# Patient Record
Sex: FEMALE | Race: WHITE | NOT HISPANIC OR LATINO | URBAN - METROPOLITAN AREA
[De-identification: names, ages, dates, MRNs, and addresses within clinical notes are randomized per-mention and may not be internally consistent; named-entity substitution may affect disease eponyms.]

---

## 2017-01-12 ENCOUNTER — GENERIC CONVERSION - ENCOUNTER (OUTPATIENT)
Dept: OTHER | Facility: OTHER | Age: 22
End: 2017-01-12

## 2017-01-16 ENCOUNTER — GENERIC CONVERSION - ENCOUNTER (OUTPATIENT)
Dept: OTHER | Facility: OTHER | Age: 22
End: 2017-01-16

## 2017-08-21 ENCOUNTER — ALLSCRIPTS OFFICE VISIT (OUTPATIENT)
Dept: OTHER | Facility: OTHER | Age: 22
End: 2017-08-21

## 2017-08-22 ENCOUNTER — ALLSCRIPTS OFFICE VISIT (OUTPATIENT)
Dept: OTHER | Facility: OTHER | Age: 22
End: 2017-08-22

## 2018-01-05 ENCOUNTER — GENERIC CONVERSION - ENCOUNTER (OUTPATIENT)
Dept: OTHER | Facility: OTHER | Age: 23
End: 2018-01-05

## 2018-01-10 NOTE — MISCELLANEOUS
Message   Recorded as Task   Date: 01/12/2017 12:36 PM, Created By: Melva Villavicencio   Task Name: Medical Complaint Callback   Assigned To: [place] Fee   Regarding Patient: Ema Jacobsen, Status: In Progress   Comment:    Genny Francisco - 12 Jan 2017 12:36 PM     TASK CREATED  Caller: Belen Izaguirre, Mother; Medical Complaint  Mom - Carliss Mon calling for daughter  Complaint of a yeast inf  Wanted Rx if possible  Daughter goes back to college this weekend  Genny Francisco - 12 Jan 2017 12:37 PM     TASK EDITED  mom is @ [phone]   Marylene Noon - 12 Jan 2017 1:01 PM     TASK IN PROGRESS   Maryphilomena Noon - 12 Jan 2017 1:03 PM     TASK EDITED  Pts Mom said pt has white dsch and itching  No odor  Wants the pill - is leaving for school Sat  Rx diflucan, 150 to ehr        Active Problems    1  Counseling for HPV (human papillomavirus) vaccination (V65 49) (Z71 89)   2  Encounter for annual routine gynecological examination (V72 31) (Z01 419)   3  Exercise-induced asthma (493 81) (J45 990)   4  Need for vaccination to prevent tuberculosis (V03 2) (Z23)   5  Surveillance for birth control, oral contraceptives (V25 41) (Z30 41)    Current Meds   1  TriNessa (28) 0 18/0 215/0 25 MG-35 MCG Oral Tablet; Take 1 tablet daily; Therapy: 70GEX9666 to (Evaluate:86Qsb9463); Last Rx:23Iyz1260 Ordered   2  Ventolin  (90 Base) MCG/ACT Inhalation Aerosol Solution; 1 puff prior to   exercise; Therapy: 91ESJ5059 to (Vikki Tian)  Requested for: 62NZG6830; Last   Rx:01Mjq0795 Ordered    Allergies    1  No Known Drug Allergies    Signatures   Electronically signed by :  Angela Gallo, ; Jan 12 2017  1:03PM EST                       (Author)

## 2018-01-13 VITALS
HEART RATE: 80 BPM | RESPIRATION RATE: 18 BRPM | SYSTOLIC BLOOD PRESSURE: 110 MMHG | HEIGHT: 64 IN | WEIGHT: 135.5 LBS | BODY MASS INDEX: 23.13 KG/M2 | DIASTOLIC BLOOD PRESSURE: 72 MMHG | TEMPERATURE: 98.9 F

## 2018-01-13 NOTE — PROGRESS NOTES
Assessment    1  Nexplanon insertion (V25 5) (Z30 017)    Discussion/Summary  Discussion Summary:   Nexplanon inserted without difficulty  Pt tolerated well  Reviewed reasons to call incl insertion site complications  Advised stric condom use x2 weeks after insertion then continued condoms for STI prevention  F/u PRN or at next routine annual gyn exam    Goals and Barriers: The patient has the current Goals: Prevent preg  The patent has the current Barriers: None  Patient's Capacity to Self-Care: Patient is able to Self-Care  Medication SE Review and Pt Understands Tx: Possible side effects of new medications were reviewed with the patient/guardian today  The treatment plan was reviewed with the patient/guardian  The patient/guardian understands and agrees with the treatment plan   Self Referrals:   Self Referrals: No      Chief Complaint  Chief Complaint Free Text Note Form: Patient is here today to get the Nexplanon insertion LMP was 08/11/2017   A urine pregnancy test was done in the office today and the results were negative   Nexplanon information Andrea Lazo [id number] Lot # [id number] expires on 08/31/2019   History of Present Illness  HPI: THis patient presents for insertion of Nexplanon  Currently using condoms  Not sexually active for >3 weeks and LMP was 2 weeks ago  Returning to college tomorrow and requests contraception prior to start of school, as she lives a distance away  She denies acute gyn complaints  Review of Systems   Female ROS: no pelvic pain, no nonmenstrual bleeding and no dysuria  Focused-Female:   Constitutional: No fever, no chills, feels well, no tiredness, no recent weight gain or loss  ENT: no ear ache, no loss of hearing, no nosebleeds or nasal discharge, no sore throat or hoarseness  Cardiovascular: no complaints of slow or fast heart rate, no chest pain, no palpitations, no leg claudication or lower extremity edema     Respiratory: no complaints of shortness of breath, no wheezing, no dyspnea on exertion, no orthopnea or PND  Breasts: no complaints of breast pain, breast lump or nipple discharge  Gastrointestinal: no complaints of abdominal pain, no constipation, no nausea or diarrhea, no vomiting, no bloody stools  Genitourinary: no complaints of dysuria, no incontinence, no pelvic pain, no dysmenorrhea, no vaginal discharge or abnormal vaginal bleeding  Musculoskeletal: no complaints of arthralgia, no myalgia, no joint swelling or stiffness, no limb pain or swelling  Integumentary: no complaints of skin rash or lesion, no itching or dry skin, no skin wounds  Neurological: no complaints of headache, no confusion, no numbness or tingling, no dizziness or fainting  ROS Reviewed:   ROS reviewed  Active Problems     1  Counseling for HPV (human papillomavirus) vaccination (V65 49) (Z71 89)   2  Encounter for annual routine gynecological examination (V72 31) (Z01 419)   3  Encounter for general counseling and advice on contraceptive management (V25 09)   (Z30 09)    Exercise-induced asthma (493 81) (J45 990)          Past Medical History    1  History of Dislocation Of The Patella (836 3)   2  History of  0 (V49 89)   3  History of acne (V13 3) (Z87 2)  Active Problems And Past Medical History Reviewed: The active problems and past medical history were reviewed and updated today  Surgical History    1  History of Appendectomy   2  History of Knee Surgery   3  History of Oral Surgery Tooth Extraction  Surgical History Reviewed: The surgical history was reviewed and updated today  Family History  Mother    1  Family history of osteoporosis (V17 81) (Z82 62)  Father    2  No pertinent family history  Maternal Grandfather    3  Family history of colon cancer (V16 0) (Z80 0)  Maternal Uncle    4  Family history of malignant neoplasm of bone (V16 8) (Z80 8)  Multiple Family Members    5   Family history of malignant neoplasm of breast (V16 3) (Z80 3)  Maternal Relatives    6  Family history of malignant neoplasm of breast (V16 3) (Z80 3)  Family History Reviewed: The family history was reviewed and updated today  Social History    · Activities: Bicycling   · Caffeine use (V49 89) (F15 90)   · Denied: History of Drug use   · Denied: History of    · Never A Smoker   · Never Drank Alcohol   · Sexually active   · Single   · Uses condoms (V15 89) (Z78 9)  Social History Reviewed: The social history was reviewed and updated today  Current Meds   1  Ventolin  (90 Base) MCG/ACT Inhalation Aerosol Solution; 1 puff prior to exercise; Therapy: 03MZP6065 to (Laneta Smoker)  Requested for: 30LPS2760; Last   Rx:2014 Ordered    Allergies    1  No Known Drug Allergies    Vitals  Vital Signs    Recorded:  75:43PX   Systolic 415, LUE, Sitting   Diastolic 70, LUE, Sitting   Height 5 ft 4 in   Weight 136 lb    BMI Calculated 23 34   BSA Calculated 1 66   LMP 64Gpz9603     Physical Exam    Constitutional   General appearance: No acute distress, well appearing and well nourished  Pulmonary   Respiratory effort: No increased work of breathing or signs of respiratory distress  Skin   Skin and subcutaneous tissue: Normal skin turgor and no rashes  Psychiatric   Orientation to person, place, and time: Normal     Mood and affect: Normal        Procedure  The risks/benefits were reviewed with the patient and written consent was obtained  The patient was positioned appropriately and comfortably  The insertion site was measured and marked per  recommendations and the site was cleaned with alcohol  1% lido without epi was injected in several sites along the insertion path  When her comfort was confirmed the site was cleansed with betadine x3  The device was inserted according to  instructions and pressure was applied at the insertion site   Hemostasis was noted and steristrips were applied in a star fashion over the incision site  A pressure dressing was then applied and skin care recommendations were reviewed  The patient tolerated the procedure well  She was advised to f/u as needed  Attending Note  Collaborating Physician Note: Collaborating Physician: I agree with the Advanced Practitioner note  Future Appointments    Date/Time Provider Specialty Site   11/22/2017 01:00 PM NGOZI Delarosa  [address] [place] [address]   08/24/2018 08:00 AM Britt Lopez OB     Signatures   Electronically signed by : KENIA Helton;  Aug 22 2017  2:35PM EST                       (Author)    Electronically signed by : Hoang Boykin DO; Aug 23 2017  9:18AM EST                       (Author)

## 2018-01-14 VITALS
WEIGHT: 134 LBS | HEIGHT: 64 IN | SYSTOLIC BLOOD PRESSURE: 120 MMHG | DIASTOLIC BLOOD PRESSURE: 70 MMHG | BODY MASS INDEX: 22.88 KG/M2

## 2018-01-17 NOTE — MISCELLANEOUS
Message   Recorded as Task   Date: 01/16/2017 10:03 AM, Created By: Radha Gonzales   Task Name: Call Back   Assigned To: Keisha Payne   Regarding Patient: Keya Carter, Status: In Progress   Comment:    ChuckieMi - 16 Jan 2017 10:03 AM     TASK CREATED  PT LM WITH THE SERVICE ON 1/15/17 AT 11:26AM THAT SHE TOOK 1 DOSE OF HER MEDS AND LEFT FOR [place] WITHOUT THE MEDICINE  PLEASE CALL PTS MOM KAREY [phone]   Lakshmi Titus - 64 Jan 2017 10:19 AM     TASK IN PROGRESS   Lakshmi Alllaura - 16 Jan 2017 10:27 AM     TASK EDITED  [phone]    150 mg, 1 po stat        Active Problems    1  Counseling for HPV (human papillomavirus) vaccination (V65 49) (Z71 89)   2  Encounter for annual routine gynecological examination (V72 31) (Z01 419)   3  Exercise-induced asthma (493 81) (J45 990)   4  Monilia infection (112 9) (B37 9)   5  Need for vaccination to prevent tuberculosis (V03 2) (Z23)   6  Surveillance for birth control, oral contraceptives (V25 41) (Z30 41)    Current Meds   1  Fluconazole 150 MG Oral Tablet (Diflucan); take one tablet today and one in 3 days; Therapy: 11HFF7691 to (Evaluate:15Jan2017)  Requested for: 45LFC5505; Last   Rx:12Jan2017 Ordered   2  TriNessa (28) 0 18/0 215/0 25 MG-35 MCG Oral Tablet; Take 1 tablet daily; Therapy: 51ATD5654 to (Evaluate:25Rqc7114); Last Rx:13Bts1830 Ordered   3  Ventolin  (90 Base) MCG/ACT Inhalation Aerosol Solution; 1 puff prior to   exercise; Therapy: 90LSM0544 to (Priscille Grade)  Requested for: 51NZE4401; Last   Rx:40Qae1830 Ordered    Allergies    1  No Known Drug Allergies    Signatures   Electronically signed by :  Sushila Gallo, ; Jan 16 2017 10:27AM EST                       (Author)

## 2018-01-22 VITALS
HEIGHT: 64 IN | DIASTOLIC BLOOD PRESSURE: 70 MMHG | SYSTOLIC BLOOD PRESSURE: 110 MMHG | WEIGHT: 136 LBS | BODY MASS INDEX: 23.22 KG/M2

## 2018-01-24 VITALS
HEIGHT: 64 IN | SYSTOLIC BLOOD PRESSURE: 102 MMHG | DIASTOLIC BLOOD PRESSURE: 68 MMHG | BODY MASS INDEX: 23.11 KG/M2 | WEIGHT: 135.38 LBS

## 2018-03-02 ENCOUNTER — TELEPHONE (OUTPATIENT)
Dept: OBGYN CLINIC | Facility: CLINIC | Age: 23
End: 2018-03-02

## 2018-03-02 NOTE — TELEPHONE ENCOUNTER
Pt needs refill called in to pharmacy in [address] [place] [address] called in [phone] number [phone] the name of drug store is [facility] services has a yeast infection had it in the past, pt's number [phone]

## 2018-03-05 NOTE — TELEPHONE ENCOUNTER
Spoke with pt - symptoms started last Wednesday  She has itching and burning when urine touches skin  Feels inflamed  She is on her period - when she inserts the tampons, feels the irritation  No OTC product used  Wants an Rx  Please advise  Thanks!

## 2018-03-06 NOTE — TELEPHONE ENCOUNTER
Diflucan 150mg po x one dose, repeat in 3 days  Also nystatin cream BID x 14 days   If not all better in 2 weeks, needs appt

## 2018-03-06 NOTE — TELEPHONE ENCOUNTER
Called patient and left message that meds were called into pharmacy  To call back with any questions  If no improvement in 2 weeks please call for appt

## 2018-03-08 DIAGNOSIS — N89.8 VAGINAL IRRITATION: Primary | ICD-10-CM

## 2018-03-08 RX ORDER — FLUCONAZOLE 150 MG/1
TABLET ORAL
Qty: 2 TABLET | Refills: 0 | Status: SHIPPED | OUTPATIENT
Start: 2018-03-08 | End: 2018-03-11

## 2018-03-08 RX ORDER — NYSTATIN 100000 U/G
CREAM TOPICAL
Qty: 30 G | Refills: 0 | Status: SHIPPED | OUTPATIENT
Start: 2018-03-08

## 2018-03-08 NOTE — TELEPHONE ENCOUNTER
Pt called in regards to medication that was suppose to be sent to pharmacy for yeast infection   Pt went to pharmacy on Tuesday and it was not there please advise rx [facility] services

## 2020-07-27 ENCOUNTER — PROCEDURE VISIT (OUTPATIENT)
Dept: OBGYN CLINIC | Facility: CLINIC | Age: 25
End: 2020-07-27
Payer: COMMERCIAL

## 2020-07-27 VITALS
DIASTOLIC BLOOD PRESSURE: 72 MMHG | SYSTOLIC BLOOD PRESSURE: 110 MMHG | BODY MASS INDEX: 26.09 KG/M2 | WEIGHT: 152 LBS | TEMPERATURE: 99.3 F

## 2020-07-27 DIAGNOSIS — Z30.46 ENCOUNTER FOR REMOVAL OF SUBDERMAL CONTRACEPTIVE IMPLANT: ICD-10-CM

## 2020-07-27 DIAGNOSIS — Z30.017 ENCOUNTER FOR INSERTION SUBDERMAL CONTRACEPTIVE: Primary | ICD-10-CM

## 2020-07-27 PROCEDURE — 11983 REMOVE/INSERT DRUG IMPLANT: CPT | Performed by: NURSE PRACTITIONER

## 2020-07-27 NOTE — PROGRESS NOTES
Remove and insert drug implant  Date/Time: 7/27/2020 1:15 PM  Performed by: KENIA Aguilar  Authorized by: KENIA Aguilar     Consent:     Consent obtained:  Verbal and written    Consent given by:  Patient    Procedural risks discussed:  Bleeding, damage to other organs, failure rate, infection, possible continued pain, possible conversion to open surgery, possible loss of function and repeat procedure    Patient questions answered: yes      Patient agrees, verbalizes understanding, and wants to proceed: yes      Instructions and paperwork completed: yes    Indication:     Indication comment:  Removal of device that expires 8/2020 and reinsertion of new device   Pre-procedure:     Pre-procedure timeout performed: yes      Prepped with: povidone-iodine      Local anesthetic:  Lidocaine without epinephrine    The site was cleaned and prepped in a sterile fashion: yes    Procedure:     Procedure:  Removal with reinsertion    Small stab incision was made in arm: yes (stab incision was made with small T-shaped extension at the medial end )      Left/right:  Left    Preloaded contraceptive capsule trocar was placed subdermally: yes      Visualization of implant was obtained: yes      Contraceptive capsule was inserted and trocar removed: yes      Visualization of notch in stylet and palpation of device: yes      Palpation confirms placement by provider and patient: yes      Site was closed with steri-strips and pressure bandage applied: yes    Comments: The procedure was reviewed and consent was obtained  Time out performed and allergies confirmed  The patient was positioned appropriately and the device was palpated  The skin was prepped and lido was injected in several sites along the insert tract  Small incision was made with #11 blade  The device was encapsulated notably and this was reduced  A T-shaped extension was made at the medial end of the stab incision   The device was then visualized and was grasped with sterile hemostats and removed  The device was noted to be intact and this was discarded  Pressure was applied until hemostasis was noted  The new device was inserted in the usual fashion and was palpated  Steristrips were applied and a pressure dressing was applied  The patient tolerated well  Reviewed post-removal reasons to call incl pain, bleeding or sx of infection

## 2020-07-27 NOTE — ASSESSMENT & PLAN NOTE
Nexplanon removed and reinserted without difficulty  T-shape extension of the incision was made and a notable degree of encapsulation was observed however the patient tolerated this well and sutures were not required  Reviewed sx to report and reasons to call, incl significant pain, bleeding or sx of infection  She is aware condoms are recommended for STI prevention as well as back up   F/u PRN

## 2020-07-27 NOTE — PATIENT INSTRUCTIONS
Etonogestrel (Implant)   Etonogestrel (e-toe-nereida-ANTON-trel)  Prevents pregnancy  Brand Name(s): Nexplanon   There may be other brand names for this medicine  When This Medicine Should Not Be Used: This medicine is not right for everyone  You should not receive it if you had an allergic reaction to etonogestrel, or if you are pregnant  Do not use it if you have breast cancer, heart disease, liver disease, or a history of blood clots (such as deep vein thrombosis, pulmonary embolism, or stroke)  How to Use This Medicine:   Implant  · A nurse or other trained health professional will give you this medicine  · This medicine is an implant  It will be surgically placed under the skin of your upper, inner arm  · Gently press your fingertips over the skin where this medicine was inserted  You should be able to feel the implant  · You might have to use another form of birth control for 7 days after the implant is inserted  Your doctor will tell you if this is needed  · Your doctor can remove the implant at any time if you want to stop using this medicine  The implant must be removed after 3 years, but you may have a new one inserted if you still want to use this form of birth control  · Read and follow the patient instructions that come with this medicine  Talk to your doctor or pharmacist if you have any questions  Drugs and Foods to Avoid:   Ask your doctor or pharmacist before using any other medicine, including over-the-counter medicines, vitamins, and herbal products  · Some foods and medicines can affect how etonogestrel works  Tell your doctor if you are using any of the following:  ¨ Bosentan, carbamazepine, cyclosporine, felbamate, griseofulvin, itraconazole, ketoconazole, lamotrigine, oxcarbazepine, phenobarbital, phenytoin, rifampin, Farrah wort, topiramate  ¨ Medicine for HIV/AIDS  Warnings While Using This Medicine:   · Tell your doctor right away if you think you become pregnant   The implant will need to be removed  · Tell your doctor if you have cancer, blood circulation problems, high blood pressure, or kidney disease, or if you smoke  Tell your doctor if you are breastfeeding, or if you have recently given birth  Also tell your doctor if you have diabetes or prediabetes, high cholesterol, or a history of depression  · This medicine may cause the following problems:  ¨ Ectopic (tubal) pregnancy  ¨ Cysts in the ovaries  ¨ Possible risk of breast cancer  ¨ Higher risk of heart attack, stroke, or blood clots  ¨ Liver cancers or tumors  ¨ High blood pressure  · This medicine may change your usual menstrual cycle  You might have irregular bleeding, or your periods may be lighter, shorter, heavier, or longer  You might not have a period in some cycles  However, call your doctor if you think you are pregnant or if you have severe pain or changes that worry you  · This medicine will not protect you from HIV/AIDS or other sexually transmitted diseases  · You might need to have the implant removed if you will be inactive for a period of time, such as after major surgery, because of the risk of blood clots  · Tell any doctor or dentist who treats you that you are using this medicine  This medicine may affect certain medical test results  · Your doctor will check your progress and the effects of this medicine at regular visits  Keep all appointments  · Keep all medicine out of the reach of children  Never share your medicine with anyone    Possible Side Effects While Using This Medicine:   Call your doctor right away if you notice any of these side effects:  · Allergic reaction: Itching or hives, swelling in your face or hands, swelling or tingling in your mouth or throat, chest tightness, trouble breathing  · Chest pain, trouble breathing, or coughing up blood  · Dark urine or pale stools, nausea, vomiting, loss of appetite, stomach pain, yellow skin or eyes  · Double vision or other trouble seeing  · Numbness or weakness on one side of your body, sudden or severe headache, problems with vision, speech, or walking  · Pain in your lower leg (calf)  · Severe or ongoing pain, tingling, bleeding, bruising, redness, itching, or swelling where the implant is placed  · Unusual or severe pain in your abdomen  · Unusual or unexpected vaginal bleeding or heavy bleeding  If you notice these less serious side effects, talk with your doctor:   · Acne or pimples  · Mild headache  · Mild pain, tingling, bleeding, bruising, redness, itching, or swelling where the implant is placed  · Mood changes  · Weight gain  If you notice other side effects that you think are caused by this medicine, tell your doctor  Call your doctor for medical advice about side effects  You may report side effects to FDA at 1-313-FDA-8125  © 2017 [employer] Information is for End User's use only and may not be sold, redistributed or otherwise used for commercial purposes  The above information is an  only  It is not intended as medical advice for individual conditions or treatments  Talk to your doctor, nurse or pharmacist before following any medical regimen to see if it is safe and effective for you

## 2020-08-03 ENCOUNTER — TELEPHONE (OUTPATIENT)
Dept: OBGYN CLINIC | Facility: CLINIC | Age: 25
End: 2020-08-03

## 2020-08-03 ENCOUNTER — OFFICE VISIT (OUTPATIENT)
Dept: OBGYN CLINIC | Facility: CLINIC | Age: 25
End: 2020-08-03
Payer: COMMERCIAL

## 2020-08-03 VITALS
WEIGHT: 150 LBS | DIASTOLIC BLOOD PRESSURE: 70 MMHG | TEMPERATURE: 97.6 F | BODY MASS INDEX: 25.75 KG/M2 | SYSTOLIC BLOOD PRESSURE: 128 MMHG

## 2020-08-03 DIAGNOSIS — Z97.5 NEXPLANON IN PLACE: Primary | ICD-10-CM

## 2020-08-03 PROCEDURE — 99213 OFFICE O/P EST LOW 20 MIN: CPT | Performed by: NURSE PRACTITIONER

## 2020-08-03 NOTE — TELEPHONE ENCOUNTER
Pt had new nexplanon inserted and prior one removed 1 week ago  She said she has a "pinching type pain" down entire arm since then  She also has discomfort under armpit ( I tried to have her feel for lump - could not find any)  No signs of infection - no redness, fever , etc  She said it is slightly better today  Wait it out? Or what f/u?   Thank you

## 2020-08-03 NOTE — TELEPHONE ENCOUNTER
Is she able to come in for me to look at it today or tomorrow?    She can be double booked in my 3:20 spots if that works for her

## 2020-08-03 NOTE — TELEPHONE ENCOUNTER
Pt had a nexplanon inserted on 7/27 and is having pain under armpit and at the site of implant is this normal?

## 2020-08-04 PROBLEM — Z97.5 NEXPLANON IN PLACE: Status: ACTIVE | Noted: 2020-08-04

## 2020-08-04 NOTE — PROGRESS NOTES
Assessment/Plan:    Nexplanon in place  Pinching/tugging sensation noted along posterior left arm when this arm is raised overhead  Exam is unremarkable  Device is palpable and nontender  Advised localized inflammation 2ndary to recent device removal suspected  Spontaneous improvement is anticipated  Recommended NSAIDs and ice PRN  F/u PRN if sx not improved within about 2 weeks  Diagnoses and all orders for this visit:    C/ Canarias 9 in place          Subjective:      Patient ID: Patrice Benitez is a 22 y o  female  This patient presents with c/o tingling/pinching in her left arm  S/p Nexplanon removal and replacement 7/27/20   She denies erythema, edema, drainage, bleeding, fever    Slight bruising present but denies tenderness   Sx occur only when she raises her arm over her head   Denies sx otherwise       The following portions of the patient's history were reviewed and updated as appropriate: allergies, current medications, past family history, past medical history, past social history, past surgical history and problem list     Review of Systems   Constitutional: Negative  Respiratory: Negative  Cardiovascular: Negative  Gastrointestinal: Negative  Genitourinary: Negative  Musculoskeletal: Negative  Left posterior/inner arm pinching/tingling from shoulder to wrist when arm is raised    Skin: Negative  Neurological: Negative  Psychiatric/Behavioral: Negative  Objective:      /70   Temp 97 6 °F (36 4 °C)   Wt 68 kg (150 lb)   LMP  (LMP Unknown) Comment: nexplanon  BMI 25 75 kg/m²          Physical Exam   Constitutional: She is oriented to person, place, and time  She appears well-developed  HENT:   Head: Normocephalic  Eyes: Pupils are equal, round, and reactive to light  Neck: Normal range of motion  Pulmonary/Chest: Effort normal    Musculoskeletal:        Arms:    Neurological: She is alert and oriented to person, place, and time     Psychiatric: Her behavior is normal  Judgment and thought content normal

## 2020-08-04 NOTE — ASSESSMENT & PLAN NOTE
Pinching/tugging sensation noted along posterior left arm when this arm is raised overhead  Exam is unremarkable  Device is palpable and nontender  Advised localized inflammation 2ndary to recent device removal suspected  Spontaneous improvement is anticipated  Recommended NSAIDs and ice PRN  F/u PRN if sx not improved within about 2 weeks

## 2023-11-01 ENCOUNTER — TELEPHONE (OUTPATIENT)
Dept: PSYCHIATRY | Facility: CLINIC | Age: 28
End: 2023-11-01

## 2023-11-01 NOTE — TELEPHONE ENCOUNTER
Patient called regarding services. Placed on Epic wait list with preferences below.      Medication Management and Talk Therapy    Zi [place], Female provider, NO ROF, prefers virtual, and open to both in person and virtual    Insurance: [facility]  Type: [employer]: [employer]

## 2024-07-22 ENCOUNTER — TELEPHONE (OUTPATIENT)
Age: 29
End: 2024-07-22

## 2024-07-22 NOTE — TELEPHONE ENCOUNTER
"Contacted patient off of NON-REFERRAL to verify needs of services in attempts to offer patient an appointment. Unable to LVM for patient to contact intake dept  in regards to wait list due to \"unable to connect your call.\" After 2x atempt  "

## 2024-08-30 ENCOUNTER — TELEPHONE (OUTPATIENT)
Age: 29
End: 2024-08-30